# Patient Record
Sex: FEMALE | Race: WHITE | Employment: FULL TIME | URBAN - METROPOLITAN AREA
[De-identification: names, ages, dates, MRNs, and addresses within clinical notes are randomized per-mention and may not be internally consistent; named-entity substitution may affect disease eponyms.]

---

## 2018-06-18 PROCEDURE — 86480 TB TEST CELL IMMUN MEASURE: CPT | Performed by: PHYSICIAN ASSISTANT

## 2018-06-18 PROCEDURE — 36415 COLL VENOUS BLD VENIPUNCTURE: CPT | Performed by: PHYSICIAN ASSISTANT

## 2019-02-05 PROCEDURE — 88175 CYTOPATH C/V AUTO FLUID REDO: CPT | Performed by: PHYSICIAN ASSISTANT

## 2019-02-05 PROCEDURE — 87624 HPV HI-RISK TYP POOLED RSLT: CPT | Performed by: PHYSICIAN ASSISTANT

## 2019-02-05 PROCEDURE — 87086 URINE CULTURE/COLONY COUNT: CPT | Performed by: PHYSICIAN ASSISTANT

## 2019-07-31 PROBLEM — Z92.89 HISTORY OF ENDOMETRIAL BIOPSY: Status: ACTIVE | Noted: 2019-07-31

## 2020-08-11 PROCEDURE — 87077 CULTURE AEROBIC IDENTIFY: CPT | Performed by: NURSE PRACTITIONER

## 2020-08-11 PROCEDURE — 87086 URINE CULTURE/COLONY COUNT: CPT | Performed by: NURSE PRACTITIONER

## 2021-02-10 DIAGNOSIS — Z23 NEED FOR VACCINATION: ICD-10-CM

## 2021-02-12 ENCOUNTER — IMMUNIZATION (OUTPATIENT)
Dept: LAB | Age: 51
End: 2021-02-12
Attending: HOSPITALIST
Payer: COMMERCIAL

## 2021-02-12 DIAGNOSIS — Z23 NEED FOR VACCINATION: Primary | ICD-10-CM

## 2021-02-12 PROCEDURE — 0001A SARSCOV2 VAC 30MCG/0.3ML IM: CPT

## 2021-03-05 ENCOUNTER — IMMUNIZATION (OUTPATIENT)
Dept: LAB | Age: 51
End: 2021-03-05
Attending: HOSPITALIST
Payer: COMMERCIAL

## 2021-03-05 DIAGNOSIS — Z23 NEED FOR VACCINATION: Primary | ICD-10-CM

## 2021-03-05 PROCEDURE — 0002A SARSCOV2 VAC 30MCG/0.3ML IM: CPT

## 2021-06-30 ENCOUNTER — HOSPITAL ENCOUNTER (OUTPATIENT)
Dept: MAMMOGRAPHY | Age: 51
Discharge: HOME | End: 2021-06-30
Attending: NURSE PRACTITIONER
Payer: COMMERCIAL

## 2021-06-30 DIAGNOSIS — Z12.31 VISIT FOR SCREENING MAMMOGRAM: ICD-10-CM

## 2021-06-30 PROCEDURE — 77063 BREAST TOMOSYNTHESIS BI: CPT | Performed by: NURSE PRACTITIONER

## 2021-06-30 PROCEDURE — 77067 SCR MAMMO BI INCL CAD: CPT | Performed by: NURSE PRACTITIONER

## 2022-11-11 PROCEDURE — 88175 CYTOPATH C/V AUTO FLUID REDO: CPT

## 2022-11-11 PROCEDURE — 87624 HPV HI-RISK TYP POOLED RSLT: CPT

## 2024-06-04 ENCOUNTER — OFFICE VISIT (OUTPATIENT)
Facility: CLINIC | Age: 54
End: 2024-06-04
Payer: COMMERCIAL

## 2024-06-04 DIAGNOSIS — N93.0 PCB (POST COITAL BLEEDING): ICD-10-CM

## 2024-06-04 DIAGNOSIS — Z92.89 HISTORY OF ENDOMETRIAL BIOPSY: ICD-10-CM

## 2024-06-04 DIAGNOSIS — N95.0 POST-MENOPAUSAL BLEEDING: Primary | ICD-10-CM

## 2024-06-04 DIAGNOSIS — N80.03 ADENOMYOSIS: ICD-10-CM

## 2024-06-04 PROCEDURE — 87624 HPV HI-RISK TYP POOLED RSLT: CPT | Performed by: OBSTETRICS & GYNECOLOGY

## 2024-06-04 PROCEDURE — 88175 CYTOPATH C/V AUTO FLUID REDO: CPT | Performed by: OBSTETRICS & GYNECOLOGY

## 2024-06-04 PROCEDURE — 99214 OFFICE O/P EST MOD 30 MIN: CPT | Performed by: OBSTETRICS & GYNECOLOGY

## 2024-06-04 NOTE — PROGRESS NOTES
Gynecology Office Visit      Laina Up is a 54 year old female  Patient's last menstrual period was 2022. (contraception:  Essure)     HPI:     Chief Complaint   Patient presents with    Problem     Pt didn't have period for 2 years then just started wiping blood last Tuesday. Started after sex. Also bloating     Light vaginal bleeding noted after intercourse  Wt gain also a concern - 10 pounds in 6 months        Chart and previous encounters reviewed.  HISTORY:  Past Medical History:    Actinic keratoses    Anxiety    Takes Xanax prn     Anxiety state, unspecified    Eczema    Hyperlipidemia LDL goal < 160    Migraines    MVP (mitral valve prolapse)    Squamous cell carcinoma in situ    Skin    Vasovagal syncope      Past Surgical History:   Procedure Laterality Date    Colonoscopy      Polyps    Colposcopy, cervix w/upper adjacent vagina; w/biopsy(s), cervix      Cryocautery of cervix      D & c      Dilation/curettage,diagnostic      Fluor gid & loclzj ndl/cath spi dx/ther njx  10/9/2013    Procedure: CERVICAL EPIDURAL;  Surgeon: Papito Urias MD;  Location: [facility]    Fluor gid & loclzj ndl/cath spi dx/ther njx  10/24/2013    Procedure: CERVICAL EPIDURAL;  Surgeon: Papito Urias MD;  Location: [facility]    Hysteroscopy      Injection, w/wo contrast, dx/therapeutic substance, epidural/subarachnoid; cervical/thoracic  10/9/2013    Procedure: CERVICAL EPIDURAL;  Surgeon: Papito Urias MD;  Location: [facility]    Injection, w/wo contrast, dx/therapeutic substance, epidural/subarachnoid; cervical/thoracic  10/24/2013    Procedure: CERVICAL EPIDURAL;  Surgeon: Papito Urias MD;  Location: [facility]    M-sedaj by sm phys perfrmg svc 5+ yr  10/9/2013    Procedure: CERVICAL EPIDURAL;  Surgeon: Papito Urias MD;  Location: [facility]    Other surgical  history  2007    ESSURE    Skin surgery  10/9/2013    Bx Proven Bowenoid Papulosis to R lateral buttock    Tubal ligation  05/2009    Essure      Family History   Problem Relation Age of Onset    Psychiatric Father         anxiety    Heart Disease Father     Cancer Father         Prostate Cancer    Cancer Maternal Grandmother         Colon Cancer    Other (Other) Maternal Grandmother     Diabetes Mother     No Known Problems Brother     Heart Disorder Maternal Grandfather         CHF/leaky valve    Heart Disorder Paternal Grandmother 77        Heart attack    Heart Disorder Paternal Grandfather 71        Heart attack    Cancer Other         no family hx Breast/Ovarian Ca      Social History:   Social History     Socioeconomic History    Marital status:    Tobacco Use    Smoking status: Never    Smokeless tobacco: Never   Vaping Use    Vaping status: Never Used   Substance and Sexual Activity    Alcohol use: Yes     Comment: Occasional wine, sparingly    Drug use: No    Sexual activity: Yes     Partners: Male     Comment: Essure   Other Topics Concern    Caffeine Concern Yes     Comment: 1 cup per day    Exercise Yes     Comment: 5x per week last assessed 7/13/11.   Social History Narrative     - 1994.  - [employer] supervisor and healthy. 1d- 12 [employer] 7th grade doing well ; 1d- 9 [employer] 4th grade doing well ; [employer] - phys ed instructor. She does cardio - runs and elliptical - half marathon - [place] half - marathon, [place] half marathon, and feels well with exercise. Lifts weights.         Medications (Active prior to today's visit):  Current Outpatient Medications   Medication Sig Dispense Refill    rosuvastatin 5 MG Oral Tab Take 5 mg by mouth nightly.      clobetasol 0.05 % External Cream Apply to AA on Lt ear nightly until improved 30 g 1    tretinoin 0.025 % External Cream Apply pea sized amount to the entire face every night as tolerated. 20 g 1    Spacer/Aero-Holding  Chambers Does not apply Device As directed 1 each 0    albuterol 108 (90 Base) MCG/ACT Inhalation Aero Soln Inhale 2 puffs into the lungs every 4 (four) hours as needed for Wheezing. 18 g 1    ALPRAZolam 0.25 MG Oral Tab Take 1 tablet (0.25 mg total) by mouth 2 (two) times daily as needed for Sleep or Anxiety. 20 tablet 0    Albuterol Sulfate  (90 Base) MCG/ACT Inhalation Aero Soln Inhale 2 puffs into the lungs every 4 (four) hours as needed for Wheezing. 1 Inhaler 0       Allergies:  Allergies   Allergen Reactions    Codeine      Derivatives; throat swelling           Gyn:  Menarche: 14  Period Cycle (Days): 90 plus  Period Duration (Days): 5-6  Period Flow: heavy  Use of Birth Control (if yes, specify type): Essure  Pap Date: 22  Pap Result Notes:  NEG/NEG, 19 Neg/Neg, 2017, neg,neg  Follow Up Recommendation:     OB Hx:  OB History    Para Term  AB Living   3 2 2   1 2   SAB IAB Ectopic Multiple Live Births   1       2      # Outcome Date GA Lbr Cresencio/2nd Weight Sex Type Anes PTL Lv   3 SAB               Birth Comments: D & C   2 Term 02 39w0d  7 lb 9 oz (3.43 kg) F NORMAL SPONT   KALYANI   1 Term 99 38w0d  7 lb 4 oz (3.289 kg) F NORMAL SPONT   KALYANI         ROS:     10 point ROS completed and was negative, except for pertinent positive and negatives stated in the HPI.    PHYSICAL EXAM:   LMP 2022      Wt Readings from Last 6 Encounters:   01/15/24 159 lb (72.1 kg)   22 147 lb (66.7 kg)   21 147 lb (66.7 kg)   21 147 lb (66.7 kg)   21 149 lb (67.6 kg)   10/11/21 143 lb (64.9 kg)        Gen:  Oriented, in no acute distress  Abdomen:  scars, scaphoid, benign without peritoneal signs, rebound tenderness,   guarding, or masses    Pelvic:      External Genitalia: Normal appearing, no lesions.  Vagina: normal pink mucosa, no lesions, normal clear discharge.    no anterior or posterior hernias.  Cervix: multiparous, no lesions , No CMT    Uterus: NSS, mobile, non tender, normal size  Adnexa: non tender, no masses, normal size  Rectal: deferred            Patient Name: Laina Up  : 4/3/1970   Phone Numbers: [phone] (home)     Date of Exam: 19  MRN: [id number]   Exam Location: Radiology - [address] Ordered by: CARLOS CASAREZ   Procedure: US PELVIS (TRANSABDOMINAL AND TRANSVAGINAL) (CPT=76856/43340)            Interpretation   DATE OF SERVICE: 2019  PELVIC ULTRASOUND, TRANSABDOMINAL AND ENDOVAGINAL  CLINICAL INFORMATION:  Pelvic and perineal pain.  COMPARISON STUDY:  None.  TECHNIQUE:  Realtime ultrasonography of the pelvis was performed utilizing transabdominal and  endovaginal technique, and static images were obtained.     LMP:  2019.  FINDINGS:  UTERUS:  Retroverted.         Size: 7.9 x 3.7 x 5.6 cm.         Endometrial thickness: 6.0 mm.         Fibroids:  Nabothian cysts are present. The uterine myometrium is heterogeneous.     RIGHT OVARY:  Measures 2.8 x 2.3 x 1.5 cm.  Small follicles are present.  LEFT OVARY:  Measures 2.2 x 2.0 x 1.3 cm. A dominant follicle measures 1.5 cm.  FLUID IN CUL DE SAC:  Small.  =====  IMPRESSION:    Heterogeneous uterus, with no discrete fibroid found.            ASSESSMENT/PLAN:     1. Post-menopausal bleeding    2. Adenomyosis    3. History of endometrial biopsy 2018 neg    4. PCB (post coital bleeding)    Rare VM symptom any more - does have brain fog and mavbe mood issues    Long conversation about HRT specifically DuaVee  NAMS  Key Points - updated [facility]I 20 year follow up.    Lowest possible dose, shortest duration, only symptomatic women constantly weighing the risks vs benefits  Women 60 or younger or 10 years from menopause is key demographic - can continue if symptomatic  CAREFUL STARTING MORE THAN 10 YEARS AFTER MENOPAUSE - benefits less favorable  Absolute risks of HRT and ERT are very rare.  Having a glass of alcohol a day has same risk of breast cancer as  HRT  Absolute risk of all forms of mortality, fracture, breast cancer, and diabetes all  less for women on HRT/ERT for women younger than 60  Increased risk of VTE, gal bladder, - CV and stroke in older patients only, prevention in younger patients    Four indications: VMS, prevention of osteoporosis, treatment of premature menopause, VV symptoms.     Nightly Micronized progesterone helps with hot flashes if E contraindicated.   Compounded bioidenticals safety concerns- only if allergic to FDA approved.     Micronized 18B estradiol is bioidentical as is micronized progesterone    Treat women in premature menopause (Primary ovarian insufficiency) until 52 - longer if symptomatic  WHI does not apply to these women    Benefit to hair, skin, nails, collagen, can help with open-angle glaucoma    Helps prevent muscle waisting    Prevents dementia in women younger than 65, may increase risk in older women    Prevents CHD in younger women - reducing the risk of blood clot, heart attack and stroke - worsens in older women.     Less than one additional women with breast cancer  per 1,000 users annually = one glass of alcohol/day and less than two glasses of alcohol / day -ERT ONLY,   ERT - NO INCREASE IN BREAST CANCER RISK AND MAY REDUCE    DuaVee - no increased risk - probably reduces risk of breast cancer.     Ok in BRCA,  with family hx of breast cancer  and hx of most genital cancers except hormone receptor breast cancer for systemic hormones, vaginal Ok     Does not increase endometrial CA recurrence    OCP's significant reduction in ovarian cancer - persists for up to 30 years    HRT/ERT reduces risk of colon cancer and morality       Meds This Visit:  Requested Prescriptions      No prescriptions requested or ordered in this encounter       Imaging & Referrals:  None     Return in about 1 week (around 6/11/2024) for ultrasound, Embx.      Peng Camilo MD  6/4/2024  1:40 PM         This note was created by  Dragon voice recognition. Errors in content may be related to improper recognition by the system; efforts to review and correct have been done but errors may still exist. Please contact me with any questions.

## 2024-06-05 LAB — HPV I/H RISK 1 DNA SPEC QL NAA+PROBE: NEGATIVE

## 2024-06-10 LAB
.: NORMAL
.: NORMAL

## 2024-06-11 ENCOUNTER — OFFICE VISIT (OUTPATIENT)
Facility: CLINIC | Age: 54
End: 2024-06-11
Payer: COMMERCIAL

## 2024-06-11 VITALS — BODY MASS INDEX: 26 KG/M2 | DIASTOLIC BLOOD PRESSURE: 80 MMHG | HEIGHT: 65 IN | SYSTOLIC BLOOD PRESSURE: 122 MMHG

## 2024-06-11 DIAGNOSIS — N95.0 PMB (POSTMENOPAUSAL BLEEDING): Primary | ICD-10-CM

## 2024-06-11 DIAGNOSIS — N95.0 POSTMENOPAUSAL BLEEDING: ICD-10-CM

## 2024-06-11 DIAGNOSIS — N95.1 VASOMOTOR SYMPTOMS DUE TO MENOPAUSE: ICD-10-CM

## 2024-06-11 PROCEDURE — 88305 TISSUE EXAM BY PATHOLOGIST: CPT | Performed by: OBSTETRICS & GYNECOLOGY

## 2024-06-11 PROCEDURE — 58100 BIOPSY OF UTERUS LINING: CPT | Performed by: OBSTETRICS & GYNECOLOGY

## 2024-06-11 RX ORDER — CONJUGATED ESTROGENS/BAZEDOXIFENE .45; 2 MG/1; MG/1
1 TABLET, FILM COATED ORAL DAILY
Qty: 90 TABLET | Refills: 3 | Status: SHIPPED | OUTPATIENT
Start: 2024-06-11 | End: 2024-07-11

## 2024-06-11 NOTE — PROGRESS NOTES
Endometrial Biopsy     Pre-Procedure Care:   Consent was obtained.  Procedure/risks were explained.  Questions were answered.  Correct patient was identified.  Correct side and site were confirmed.    Pregnancy Results: negative from urine test   Birth control method(s) used:      Indication: Perimenopausal bleeding    Pre-Medications:    The patient was premedicated with Ibuprofen .    Description of Procedure:   A speculum was placed in the vagina and the cervix was prepped with betadine solution.   Single tooth tenaculum placed at the 6 o'clock position.   The uterine cavity was sounded at 6 cm.   The endometrial cavity was curetted for pipelle tissue sampling with 5 passes.  Specimen was sent to pathology.   The single tooth tenaculum was removed.   Silver nitrate was applied at the site of tenaculum application   Good hemostasis was noted.  There were no complications.    There was no blood loss.      Discharge instructions were provided to the patient.    Visit Plan:  Start DuaVee - counseled again  Ultrasound report was reviewed with the patient.    Symptoms diary        Peng Camilo MD  6/11/2024  11:03 AM

## 2024-06-13 ENCOUNTER — MED REC SCAN ONLY (OUTPATIENT)
Facility: CLINIC | Age: 54
End: 2024-06-13

## 2025-01-31 ENCOUNTER — TELEPHONE (OUTPATIENT)
Facility: CLINIC | Age: 55
End: 2025-01-31

## (undated) NOTE — LETTER
Laina Up, :4/3/1970    CONSENT FOR PROCEDURE/SEDATION    1. I authorize the performance upon Laina Up  the following: Endometrial Biopsy    2. I authorize Dr. Peng Camilo MD (and whomever is designated as the doctor’s assistant), to perform the above-mentioned procedures.    3. If any unforeseen conditions arise during this procedure calling for additional  procedures, operations, or medications (including anesthesia and blood transfusion), I further request and authorize the doctor to do whatever he/she deems advisable in my interest.    4. I consent to the taking and reproduction of any photographs in the course of this procedure for professional purposes.    5. I consent to the administration of such sedation as may be considered necessary or advisable by the physician responsible for this service, with the exception of ______________________________________________________    6. I have been informed by my doctor of the nature and purpose of this procedure sedation, possible alternative methods of treatment, risk involved and possible complications.    7. If I have a Do Not Resuscitate (DNR) order in place, the physician and I (or the individual authorized to consent on my behalf) will discuss and agree as to whether the Do Not Resuscitate (DNR) order will remain in effect or will be discontinued during the performance of the procedure and the applicable recovery period. If the Do Not Resuscitate (DNR) order is discontinued and is to be reinstated following the procedure/recovery period, the physician will determine when the applicable recovery period ends for purposes of reinstating the Do Not Resuscitate (DNR) order.    Signature of Patient:_______________________________________________    Signature of person authorized to consent for patient:  _______________________________________________________________    Relationship to patient:  ____________________________________________    Witness: _________________________________________ Date:___________     Physician Signature: _______________________________ Date:___________